# Patient Record
Sex: FEMALE | Race: BLACK OR AFRICAN AMERICAN | NOT HISPANIC OR LATINO | Employment: OTHER | URBAN - METROPOLITAN AREA
[De-identification: names, ages, dates, MRNs, and addresses within clinical notes are randomized per-mention and may not be internally consistent; named-entity substitution may affect disease eponyms.]

---

## 2017-04-03 ENCOUNTER — PREPPED CHART (OUTPATIENT)
Dept: URBAN - METROPOLITAN AREA CLINIC 35 | Facility: CLINIC | Age: 67
End: 2017-04-03

## 2017-08-15 ASSESSMENT — TONOMETRY
OD_IOP_MMHG: 9
OS_IOP_MMHG: 9

## 2017-08-15 ASSESSMENT — VISUAL ACUITY
OS_SC: 20/40+2
OD_SC: 20/30-1

## 2017-08-21 ENCOUNTER — VISUAL FIELD (OUTPATIENT)
Dept: URBAN - METROPOLITAN AREA CLINIC 35 | Facility: CLINIC | Age: 67
End: 2017-08-21

## 2017-08-21 DIAGNOSIS — H40.1112: ICD-10-CM

## 2017-08-21 DIAGNOSIS — Z96.1: ICD-10-CM

## 2017-08-21 DIAGNOSIS — H40.1123: ICD-10-CM

## 2017-08-21 DIAGNOSIS — H04.123: ICD-10-CM

## 2017-08-21 DIAGNOSIS — H26.493: ICD-10-CM

## 2017-08-21 PROCEDURE — 92083 EXTENDED VISUAL FIELD XM: CPT

## 2017-08-21 PROCEDURE — 92012 INTRM OPH EXAM EST PATIENT: CPT

## 2017-08-21 ASSESSMENT — TONOMETRY
OD_IOP_MMHG: 12
OS_IOP_MMHG: 10

## 2017-08-21 ASSESSMENT — VISUAL ACUITY
OD_SC: 20/30-2
OS_SC: 20/30-2

## 2017-11-14 ENCOUNTER — IOP CHECK (OUTPATIENT)
Dept: URBAN - METROPOLITAN AREA CLINIC 35 | Facility: CLINIC | Age: 67
End: 2017-11-14

## 2017-11-14 DIAGNOSIS — H40.1123: ICD-10-CM

## 2017-11-14 DIAGNOSIS — H40.1112: ICD-10-CM

## 2017-11-14 PROCEDURE — 92083 EXTENDED VISUAL FIELD XM: CPT

## 2017-11-14 PROCEDURE — 92012 INTRM OPH EXAM EST PATIENT: CPT

## 2017-11-14 ASSESSMENT — VISUAL ACUITY
OD_SC: 20/30-2
OS_SC: 20/25-1

## 2017-11-14 ASSESSMENT — TONOMETRY
OS_IOP_MMHG: 12
OD_IOP_MMHG: 12

## 2018-03-21 ENCOUNTER — ESTABLISHED COMPREHENSIVE EXAM (OUTPATIENT)
Dept: URBAN - METROPOLITAN AREA CLINIC 35 | Facility: CLINIC | Age: 68
End: 2018-03-21

## 2018-03-21 DIAGNOSIS — H10.45: ICD-10-CM

## 2018-03-21 DIAGNOSIS — H40.1123: ICD-10-CM

## 2018-03-21 DIAGNOSIS — H26.493: ICD-10-CM

## 2018-03-21 DIAGNOSIS — H40.1112: ICD-10-CM

## 2018-03-21 PROCEDURE — 3285F IOP DOWN <15% OF PRE-SVC LVL: CPT

## 2018-03-21 PROCEDURE — 2027F OPTIC NERVE HEAD EVAL DONE: CPT

## 2018-03-21 PROCEDURE — 92014 COMPRE OPH EXAM EST PT 1/>: CPT

## 2018-03-21 PROCEDURE — 0517F GLAUCOMA PLAN OF CARE DOCD: CPT

## 2018-03-21 PROCEDURE — 92133 CPTRZD OPH DX IMG PST SGM ON: CPT

## 2018-03-21 PROCEDURE — G8427 DOCREV CUR MEDS BY ELIG CLIN: HCPCS

## 2018-03-21 PROCEDURE — 92015 DETERMINE REFRACTIVE STATE: CPT

## 2018-03-21 ASSESSMENT — VISUAL ACUITY
OS_SC: 20/40-1
OD_BAT: 20/50
OD_PH: 20/30-2
OD_CC: J1
OS_CC: J3
OD_SC: 20/40-1
OS_PH: 20/30-2
OS_BAT: 20/60

## 2018-03-21 ASSESSMENT — TONOMETRY
OS_IOP_MMHG: 12
OD_IOP_MMHG: 13

## 2018-09-17 ENCOUNTER — IOP CHECK (OUTPATIENT)
Dept: URBAN - METROPOLITAN AREA CLINIC 35 | Facility: CLINIC | Age: 68
End: 2018-09-17

## 2018-09-17 DIAGNOSIS — H40.1123: ICD-10-CM

## 2018-09-17 DIAGNOSIS — H40.1112: ICD-10-CM

## 2018-09-17 PROCEDURE — 92012 INTRM OPH EXAM EST PATIENT: CPT

## 2018-09-17 PROCEDURE — 92083 EXTENDED VISUAL FIELD XM: CPT

## 2018-09-17 RX ORDER — BIMATOPROST 0.1 MG/ML
1 SOLUTION/ DROPS OPHTHALMIC EVERY EVENING
Start: 2017-08-21

## 2018-09-17 ASSESSMENT — VISUAL ACUITY
OS_SC: 20/30-2
OD_SC: 20/40+2

## 2018-09-17 ASSESSMENT — TONOMETRY
OD_IOP_MMHG: 13
OS_IOP_MMHG: 10

## 2019-03-18 ENCOUNTER — ESTABLISHED COMPREHENSIVE EXAM (OUTPATIENT)
Dept: URBAN - METROPOLITAN AREA CLINIC 35 | Facility: CLINIC | Age: 69
End: 2019-03-18

## 2019-03-18 DIAGNOSIS — H04.123: ICD-10-CM

## 2019-03-18 DIAGNOSIS — H26.492: ICD-10-CM

## 2019-03-18 DIAGNOSIS — H40.1123: ICD-10-CM

## 2019-03-18 DIAGNOSIS — H40.1112: ICD-10-CM

## 2019-03-18 PROCEDURE — 92015 DETERMINE REFRACTIVE STATE: CPT

## 2019-03-18 PROCEDURE — 92014 COMPRE OPH EXAM EST PT 1/>: CPT

## 2019-03-18 PROCEDURE — 92133 CPTRZD OPH DX IMG PST SGM ON: CPT

## 2019-03-18 ASSESSMENT — VISUAL ACUITY
OS_SC: J16
OS_SC: 20/50
OD_SC: 20/40
OD_SC: J7

## 2019-03-18 ASSESSMENT — TONOMETRY
OS_IOP_MMHG: 11
OD_IOP_MMHG: 12

## 2019-03-20 ASSESSMENT — VISUAL ACUITY
OS_SC: J12
OS_SC: 20/50
OD_SC: 20/40
OD_SC: J7

## 2019-03-21 ENCOUNTER — SURGERY/PROCEDURE (OUTPATIENT)
Dept: URBAN - METROPOLITAN AREA SURGERY 14 | Facility: SURGERY | Age: 69
End: 2019-03-21

## 2019-03-21 ENCOUNTER — CONSULT (OUTPATIENT)
Dept: URBAN - METROPOLITAN AREA SURGERY 14 | Facility: SURGERY | Age: 69
End: 2019-03-21

## 2019-03-21 DIAGNOSIS — H26.492: ICD-10-CM

## 2019-03-21 PROCEDURE — 66821 AFTER CATARACT LASER SURGERY: CPT

## 2019-03-21 PROCEDURE — 92014 COMPRE OPH EXAM EST PT 1/>: CPT

## 2019-03-21 ASSESSMENT — TONOMETRY
OD_IOP_MMHG: 12
OS_IOP_MMHG: 12

## 2019-03-26 ENCOUNTER — POST-OP (OUTPATIENT)
Dept: URBAN - METROPOLITAN AREA CLINIC 35 | Facility: CLINIC | Age: 69
End: 2019-03-26

## 2019-03-26 DIAGNOSIS — Z98.890: ICD-10-CM

## 2019-03-26 PROCEDURE — 99024 POSTOP FOLLOW-UP VISIT: CPT

## 2019-03-26 ASSESSMENT — VISUAL ACUITY
OS_SC: 20/40
OD_SC: 20/40

## 2019-03-26 ASSESSMENT — TONOMETRY
OD_IOP_MMHG: 12
OS_IOP_MMHG: 12

## 2019-09-18 ENCOUNTER — IOP CHECK (OUTPATIENT)
Dept: URBAN - METROPOLITAN AREA CLINIC 35 | Facility: CLINIC | Age: 69
End: 2019-09-18

## 2019-09-18 DIAGNOSIS — H40.1123: ICD-10-CM

## 2019-09-18 DIAGNOSIS — H40.1112: ICD-10-CM

## 2019-09-18 DIAGNOSIS — H04.123: ICD-10-CM

## 2019-09-18 DIAGNOSIS — H01.014: ICD-10-CM

## 2019-09-18 PROCEDURE — 92083 EXTENDED VISUAL FIELD XM: CPT

## 2019-09-18 PROCEDURE — 92012 INTRM OPH EXAM EST PATIENT: CPT

## 2019-09-18 RX ORDER — ERYTHROMYCIN 5 MG/G
OINTMENT OPHTHALMIC EVERY EVENING
Start: 2019-09-18

## 2019-09-18 ASSESSMENT — VISUAL ACUITY
OD_SC: 20/30-2
OS_SC: 20/40

## 2019-09-18 ASSESSMENT — TONOMETRY
OD_IOP_MMHG: 12
OS_IOP_MMHG: 12

## 2020-05-21 ENCOUNTER — ESTABLISHED COMPREHENSIVE EXAM (OUTPATIENT)
Dept: URBAN - METROPOLITAN AREA CLINIC 35 | Facility: CLINIC | Age: 70
End: 2020-05-21

## 2020-05-21 DIAGNOSIS — H26.492: ICD-10-CM

## 2020-05-21 DIAGNOSIS — H40.1123: ICD-10-CM

## 2020-05-21 DIAGNOSIS — H31.102: ICD-10-CM

## 2020-05-21 DIAGNOSIS — H01.014: ICD-10-CM

## 2020-05-21 DIAGNOSIS — H40.1112: ICD-10-CM

## 2020-05-21 DIAGNOSIS — Z98.890: ICD-10-CM

## 2020-05-21 DIAGNOSIS — H26.491: ICD-10-CM

## 2020-05-21 DIAGNOSIS — H04.123: ICD-10-CM

## 2020-05-21 PROCEDURE — 92014 COMPRE OPH EXAM EST PT 1/>: CPT

## 2020-05-21 PROCEDURE — 92250 FUNDUS PHOTOGRAPHY W/I&R: CPT

## 2020-05-21 PROCEDURE — 92133 CPTRZD OPH DX IMG PST SGM ON: CPT

## 2020-05-21 PROCEDURE — 92015 DETERMINE REFRACTIVE STATE: CPT

## 2020-05-21 ASSESSMENT — KERATOMETRY
OD_K1POWER_DIOPTERS: 44.25
OS_K1POWER_DIOPTERS: 43.75
OD_K2POWER_DIOPTERS: 43.75
OS_K2POWER_DIOPTERS: 42.75

## 2020-05-21 ASSESSMENT — VISUAL ACUITY
OS_SC: J3
OU_SC: 20/25-2
OD_SC: J3
OS_PH: 20/30
OS_SC: 20/40
OD_PH: 20/25
OU_SC: J2-
OD_SC: 20/30-2

## 2020-05-21 ASSESSMENT — TONOMETRY
OS_IOP_MMHG: 13
OD_IOP_MMHG: 12

## 2020-09-15 ASSESSMENT — KERATOMETRY
OD_K2POWER_DIOPTERS: 43.75
OS_K2POWER_DIOPTERS: 42.75
OD_K1POWER_DIOPTERS: 44.25
OS_K1POWER_DIOPTERS: 43.75

## 2020-09-25 ENCOUNTER — TECH ONLY (OUTPATIENT)
Dept: URBAN - METROPOLITAN AREA CLINIC 35 | Facility: CLINIC | Age: 70
End: 2020-09-25

## 2020-09-25 DIAGNOSIS — H40.1123: ICD-10-CM

## 2020-09-25 DIAGNOSIS — H40.1112: ICD-10-CM

## 2020-09-25 PROCEDURE — 92083 EXTENDED VISUAL FIELD XM: CPT

## 2020-09-25 PROCEDURE — 99211T TECH SERVICE

## 2020-11-11 ENCOUNTER — IOP CHECK (OUTPATIENT)
Dept: URBAN - METROPOLITAN AREA CLINIC 35 | Facility: CLINIC | Age: 70
End: 2020-11-11

## 2020-11-11 DIAGNOSIS — H04.123: ICD-10-CM

## 2020-11-11 DIAGNOSIS — H40.1112: ICD-10-CM

## 2020-11-11 DIAGNOSIS — H40.1123: ICD-10-CM

## 2020-11-11 DIAGNOSIS — H10.45: ICD-10-CM

## 2020-11-11 PROCEDURE — 92012 INTRM OPH EXAM EST PATIENT: CPT

## 2020-11-11 RX ORDER — OLOPATADINE HYDROCHLORIDE 2 MG/ML
1 SOLUTION OPHTHALMIC EVERY MORNING
Start: 2020-11-11

## 2020-11-11 ASSESSMENT — KERATOMETRY
OS_K1POWER_DIOPTERS: 43.75
OD_K2POWER_DIOPTERS: 43.75
OS_K2POWER_DIOPTERS: 42.75
OD_K1POWER_DIOPTERS: 44.25

## 2020-11-11 ASSESSMENT — VISUAL ACUITY
OS_SC: 20/40
OU_SC: 20/30-2
OD_SC: 20/40-1

## 2020-11-11 ASSESSMENT — TONOMETRY
OD_IOP_MMHG: 14
OS_IOP_MMHG: 14

## 2021-05-12 ENCOUNTER — ESTABLISHED COMPREHENSIVE EXAM (OUTPATIENT)
Dept: URBAN - METROPOLITAN AREA CLINIC 35 | Facility: CLINIC | Age: 71
End: 2021-05-12

## 2021-05-12 DIAGNOSIS — H40.1123: ICD-10-CM

## 2021-05-12 DIAGNOSIS — H31.102: ICD-10-CM

## 2021-05-12 DIAGNOSIS — H10.45: ICD-10-CM

## 2021-05-12 DIAGNOSIS — H26.491: ICD-10-CM

## 2021-05-12 DIAGNOSIS — H40.1112: ICD-10-CM

## 2021-05-12 DIAGNOSIS — H01.014: ICD-10-CM

## 2021-05-12 DIAGNOSIS — H52.13: ICD-10-CM

## 2021-05-12 DIAGNOSIS — H04.123: ICD-10-CM

## 2021-05-12 PROCEDURE — 92014 COMPRE OPH EXAM EST PT 1/>: CPT

## 2021-05-12 PROCEDURE — 92015 DETERMINE REFRACTIVE STATE: CPT

## 2021-05-12 PROCEDURE — 92133 CPTRZD OPH DX IMG PST SGM ON: CPT

## 2021-05-12 ASSESSMENT — VISUAL ACUITY
OS_CC: J1
OD_CC: J1
OS_SC: 20/30-2
OD_SC: 20/30-2

## 2021-05-12 ASSESSMENT — KERATOMETRY
OS_K2POWER_DIOPTERS: 44
OS_K1POWER_DIOPTERS: 43.75
OD_K2POWER_DIOPTERS: 42.75
OD_K1POWER_DIOPTERS: 44.75

## 2021-05-12 ASSESSMENT — TONOMETRY
OS_IOP_MMHG: 13
OD_IOP_MMHG: 13

## 2021-09-30 ASSESSMENT — KERATOMETRY
OD_K1POWER_DIOPTERS: 44.75
OS_K1POWER_DIOPTERS: 43.75
OD_K2POWER_DIOPTERS: 42.75
OS_K2POWER_DIOPTERS: 44

## 2021-10-08 ENCOUNTER — TECH ONLY (OUTPATIENT)
Dept: URBAN - METROPOLITAN AREA CLINIC 35 | Facility: CLINIC | Age: 71
End: 2021-10-08

## 2021-10-08 DIAGNOSIS — H40.1112: ICD-10-CM

## 2021-10-08 DIAGNOSIS — H40.1123: ICD-10-CM

## 2021-10-08 PROCEDURE — 99211T TECH SERVICE

## 2021-10-08 PROCEDURE — 92083 EXTENDED VISUAL FIELD XM: CPT

## 2021-10-08 RX ORDER — BIMATOPROST 0.1 MG/ML: 1 SOLUTION/ DROPS OPHTHALMIC EVERY EVENING

## 2021-10-08 RX ORDER — BRIMONIDINE TARTRATE 2 MG/MG: 1 SOLUTION/ DROPS OPHTHALMIC TWICE A DAY

## 2021-12-06 ENCOUNTER — FOLLOW UP (OUTPATIENT)
Dept: URBAN - METROPOLITAN AREA CLINIC 35 | Facility: CLINIC | Age: 71
End: 2021-12-06

## 2021-12-06 DIAGNOSIS — H40.1123: ICD-10-CM

## 2021-12-06 DIAGNOSIS — H10.45: ICD-10-CM

## 2021-12-06 DIAGNOSIS — H04.123: ICD-10-CM

## 2021-12-06 DIAGNOSIS — H40.1112: ICD-10-CM

## 2021-12-06 DIAGNOSIS — H01.014: ICD-10-CM

## 2021-12-06 PROCEDURE — 92012 INTRM OPH EXAM EST PATIENT: CPT

## 2021-12-06 ASSESSMENT — KERATOMETRY
OS_K2POWER_DIOPTERS: 44
OS_K1POWER_DIOPTERS: 43.75
OD_K1POWER_DIOPTERS: 44.75
OD_K2POWER_DIOPTERS: 42.75

## 2021-12-06 ASSESSMENT — VISUAL ACUITY
OU_SC: 20/30+1
OD_SC: 20/30-2
OS_SC: 20/40+1

## 2021-12-06 ASSESSMENT — TONOMETRY
OD_IOP_MMHG: 11
OS_IOP_MMHG: 12

## 2022-05-16 ENCOUNTER — COMPREHENSIVE EXAM (OUTPATIENT)
Dept: URBAN - METROPOLITAN AREA CLINIC 35 | Facility: CLINIC | Age: 72
End: 2022-05-16

## 2022-05-16 DIAGNOSIS — H26.491: ICD-10-CM

## 2022-05-16 DIAGNOSIS — H31.102: ICD-10-CM

## 2022-05-16 DIAGNOSIS — H40.1112: ICD-10-CM

## 2022-05-16 DIAGNOSIS — H26.492: ICD-10-CM

## 2022-05-16 DIAGNOSIS — H52.13: ICD-10-CM

## 2022-05-16 DIAGNOSIS — H01.014: ICD-10-CM

## 2022-05-16 DIAGNOSIS — H04.123: ICD-10-CM

## 2022-05-16 DIAGNOSIS — H40.1123: ICD-10-CM

## 2022-05-16 DIAGNOSIS — Z98.890: ICD-10-CM

## 2022-05-16 DIAGNOSIS — H10.45: ICD-10-CM

## 2022-05-16 PROCEDURE — 92015 DETERMINE REFRACTIVE STATE: CPT

## 2022-05-16 PROCEDURE — 92014 COMPRE OPH EXAM EST PT 1/>: CPT

## 2022-05-16 PROCEDURE — 92250 FUNDUS PHOTOGRAPHY W/I&R: CPT

## 2022-05-16 ASSESSMENT — TONOMETRY
OD_IOP_MMHG: 11
OS_IOP_MMHG: 11

## 2022-05-16 ASSESSMENT — VISUAL ACUITY
OS_SC: J7
OS_SC: 20/40
OD_SC: 20/30
OD_SC: J5

## 2022-05-23 NOTE — PATIENT DISCUSSION
Educated patient on findings and condition. Continue artificial tears BID/prn OU and begin warm compresses QD/prn OU. Monitor.

## 2022-05-23 NOTE — PATIENT DISCUSSION
Educated patient on findings. Based on strong FHx and cupping OU. IOP WNL with normal/stable OCT RNFL today OU. Strong FHx. No treatment indicated at this time. Order baseline HVF 24-2 OU in 2-3 months. Follow-up as directed. Monitor.

## 2022-10-28 ENCOUNTER — TECH ONLY (OUTPATIENT)
Dept: URBAN - METROPOLITAN AREA CLINIC 35 | Facility: CLINIC | Age: 72
End: 2022-10-28

## 2022-10-28 DIAGNOSIS — H40.1112: ICD-10-CM

## 2022-10-28 DIAGNOSIS — H40.1123: ICD-10-CM

## 2022-10-28 PROCEDURE — 92083 EXTENDED VISUAL FIELD XM: CPT

## 2022-10-28 PROCEDURE — 99211T TECH SERVICE

## 2022-10-28 ASSESSMENT — KERATOMETRY
OS_K1POWER_DIOPTERS: 43.75
OS_K2POWER_DIOPTERS: 44
OD_K2POWER_DIOPTERS: 42.75
OD_K1POWER_DIOPTERS: 44.75

## 2022-11-14 ENCOUNTER — FOLLOW UP (OUTPATIENT)
Dept: URBAN - METROPOLITAN AREA CLINIC 35 | Facility: CLINIC | Age: 72
End: 2022-11-14

## 2022-11-14 DIAGNOSIS — H40.1112: ICD-10-CM

## 2022-11-14 DIAGNOSIS — H04.123: ICD-10-CM

## 2022-11-14 DIAGNOSIS — H40.1123: ICD-10-CM

## 2022-11-14 PROCEDURE — 92133 CPTRZD OPH DX IMG PST SGM ON: CPT

## 2022-11-14 PROCEDURE — 92012 INTRM OPH EXAM EST PATIENT: CPT

## 2022-11-14 ASSESSMENT — VISUAL ACUITY
OS_PH: 20/30
OD_PH: 20/30+2
OD_SC: 20/40
OS_SC: 20/40

## 2022-11-14 ASSESSMENT — TONOMETRY
OD_IOP_MMHG: 14
OS_IOP_MMHG: 14

## 2022-12-02 NOTE — PATIENT DISCUSSION
Baseline HVF 24-2 today WNL OD, nasal edge points OS (poor reliability). No treatment indicated at this time. Follow-up as directed. Monitor.

## 2022-12-12 ENCOUNTER — CONSULTATION/EVALUATION (OUTPATIENT)
Dept: URBAN - METROPOLITAN AREA CLINIC 39 | Facility: CLINIC | Age: 72
End: 2022-12-12

## 2022-12-12 PROCEDURE — 65855 TRABECULOPLASTY LASER SURG: CPT

## 2022-12-12 PROCEDURE — 92250 FUNDUS PHOTOGRAPHY W/I&R: CPT

## 2022-12-12 PROCEDURE — 92020 GONIOSCOPY: CPT

## 2022-12-12 PROCEDURE — 92004 COMPRE OPH EXAM NEW PT 1/>: CPT

## 2022-12-12 RX ORDER — BROMFENAC SODIUM 0.7 MG/ML: 1 SOLUTION/ DROPS OPHTHALMIC ONCE A DAY

## 2022-12-12 ASSESSMENT — TONOMETRY
OS_IOP_MMHG: 13
OD_IOP_MMHG: 14

## 2022-12-12 ASSESSMENT — VISUAL ACUITY
OS_PH: 20/25
OD_SC: J4
OD_SC: 20/30
OS_SC: 20/40
OS_SC: J6

## 2023-01-11 ENCOUNTER — FOLLOW UP (OUTPATIENT)
Dept: URBAN - METROPOLITAN AREA CLINIC 35 | Facility: CLINIC | Age: 73
End: 2023-01-11

## 2023-01-11 DIAGNOSIS — H40.1123: ICD-10-CM

## 2023-01-11 DIAGNOSIS — H40.1112: ICD-10-CM

## 2023-01-11 PROCEDURE — 92012 INTRM OPH EXAM EST PATIENT: CPT

## 2023-01-11 ASSESSMENT — TONOMETRY
OS_IOP_MMHG: 12
OD_IOP_MMHG: 12

## 2023-01-11 ASSESSMENT — VISUAL ACUITY
OS_PH: 20/30+2
OS_SC: 20/40+2
OD_PH: 20/25

## 2023-04-18 ENCOUNTER — COMPREHENSIVE EXAM (OUTPATIENT)
Dept: URBAN - METROPOLITAN AREA CLINIC 35 | Facility: CLINIC | Age: 73
End: 2023-04-18

## 2023-04-18 DIAGNOSIS — H04.123: ICD-10-CM

## 2023-04-18 DIAGNOSIS — H26.491: ICD-10-CM

## 2023-04-18 DIAGNOSIS — H40.1123: ICD-10-CM

## 2023-04-18 DIAGNOSIS — H10.45: ICD-10-CM

## 2023-04-18 DIAGNOSIS — H01.014: ICD-10-CM

## 2023-04-18 DIAGNOSIS — H52.13: ICD-10-CM

## 2023-04-18 DIAGNOSIS — H40.1112: ICD-10-CM

## 2023-04-18 DIAGNOSIS — H31.102: ICD-10-CM

## 2023-04-18 PROCEDURE — 92015 DETERMINE REFRACTIVE STATE: CPT

## 2023-04-18 PROCEDURE — 92014 COMPRE OPH EXAM EST PT 1/>: CPT

## 2023-04-18 PROCEDURE — 92250 FUNDUS PHOTOGRAPHY W/I&R: CPT

## 2023-04-18 RX ORDER — LOTEPREDNOL ETABONATE 3.8 MG/G
1 GEL OPHTHALMIC
Start: 2023-04-18 | End: 2023-05-09

## 2023-04-18 ASSESSMENT — KERATOMETRY
OD_AXISANGLE_DEGREES: 150
OD_K1POWER_DIOPTERS: 43.25
OD_K2POWER_DIOPTERS: 43.75
OS_K1POWER_DIOPTERS: 43.00
OS_AXISANGLE2_DEGREES: 165
OS_K2POWER_DIOPTERS: 43.50
OD_AXISANGLE2_DEGREES: 60
OS_AXISANGLE_DEGREES: 75

## 2023-04-18 ASSESSMENT — VISUAL ACUITY
OU_SC: 20/30
OD_SC: 20/50
OD_PH: 20/30
OS_SC: J10
OD_SC: J10
OS_PH: 20/25-2
OU_SC: J6
OS_SC: 20/40+2

## 2023-04-18 ASSESSMENT — TONOMETRY
OS_IOP_MMHG: 11
OD_IOP_MMHG: 11

## 2023-10-12 ENCOUNTER — TECH ONLY (OUTPATIENT)
Dept: URBAN - METROPOLITAN AREA CLINIC 35 | Facility: CLINIC | Age: 73
End: 2023-10-12

## 2023-10-12 DIAGNOSIS — H40.1123: ICD-10-CM

## 2023-10-12 DIAGNOSIS — H40.1112: ICD-10-CM

## 2023-10-12 PROCEDURE — 99211T TECH SERVICE: Mod: TC

## 2023-10-12 PROCEDURE — 92083 EXTENDED VISUAL FIELD XM: CPT

## 2023-10-12 ASSESSMENT — KERATOMETRY
OS_K2POWER_DIOPTERS: 43.50
OS_AXISANGLE_DEGREES: 75
OD_AXISANGLE2_DEGREES: 60
OD_K1POWER_DIOPTERS: 43.25
OD_AXISANGLE_DEGREES: 150
OD_K2POWER_DIOPTERS: 43.75
OS_AXISANGLE2_DEGREES: 165
OS_K1POWER_DIOPTERS: 43.00

## 2023-10-18 ENCOUNTER — FOLLOW UP (OUTPATIENT)
Dept: URBAN - METROPOLITAN AREA CLINIC 35 | Facility: CLINIC | Age: 73
End: 2023-10-18

## 2023-10-18 DIAGNOSIS — H01.014: ICD-10-CM

## 2023-10-18 DIAGNOSIS — H40.1112: ICD-10-CM

## 2023-10-18 DIAGNOSIS — H40.1123: ICD-10-CM

## 2023-10-18 DIAGNOSIS — H10.45: ICD-10-CM

## 2023-10-18 DIAGNOSIS — H04.123: ICD-10-CM

## 2023-10-18 DIAGNOSIS — H31.103: ICD-10-CM

## 2023-10-18 DIAGNOSIS — H26.491: ICD-10-CM

## 2023-10-18 PROCEDURE — 92012 INTRM OPH EXAM EST PATIENT: CPT

## 2023-10-18 ASSESSMENT — TONOMETRY
OD_IOP_MMHG: 12
OS_IOP_MMHG: 12

## 2023-10-18 ASSESSMENT — VISUAL ACUITY
OD_SC: 20/40+1
OS_SC: 20/30-2
OU_SC: 20/30+2
OD_PH: 20/30

## 2023-12-06 ENCOUNTER — EMERGENCY VISIT (OUTPATIENT)
Dept: URBAN - METROPOLITAN AREA CLINIC 35 | Facility: CLINIC | Age: 73
End: 2023-12-06

## 2023-12-06 DIAGNOSIS — H04.123: ICD-10-CM

## 2023-12-06 DIAGNOSIS — H10.45: ICD-10-CM

## 2023-12-06 DIAGNOSIS — H40.1123: ICD-10-CM

## 2023-12-06 DIAGNOSIS — H40.1112: ICD-10-CM

## 2023-12-06 PROCEDURE — 99213 OFFICE O/P EST LOW 20 MIN: CPT

## 2023-12-06 RX ORDER — PREDNISOLONE ACETATE 10 MG/ML
1 SUSPENSION/ DROPS OPHTHALMIC TWICE A DAY
Start: 2023-12-06

## 2023-12-06 ASSESSMENT — TONOMETRY
OS_IOP_MMHG: 11
OD_IOP_MMHG: 11

## 2023-12-06 ASSESSMENT — VISUAL ACUITY
OD_SC: 20/50
OS_SC: 20/50
OD_PH: 20/30-2
OS_PH: 20/40

## 2024-04-10 ENCOUNTER — COMPREHENSIVE EXAM (OUTPATIENT)
Dept: URBAN - METROPOLITAN AREA CLINIC 35 | Facility: CLINIC | Age: 74
End: 2024-04-10

## 2024-04-10 DIAGNOSIS — H01.014: ICD-10-CM

## 2024-04-10 DIAGNOSIS — H40.1123: ICD-10-CM

## 2024-04-10 DIAGNOSIS — H52.13: ICD-10-CM

## 2024-04-10 DIAGNOSIS — H26.491: ICD-10-CM

## 2024-04-10 DIAGNOSIS — H40.1112: ICD-10-CM

## 2024-04-10 DIAGNOSIS — H04.123: ICD-10-CM

## 2024-04-10 PROCEDURE — 92133 CPTRZD OPH DX IMG PST SGM ON: CPT

## 2024-04-10 PROCEDURE — 92014 COMPRE OPH EXAM EST PT 1/>: CPT

## 2024-04-10 PROCEDURE — 92015 DETERMINE REFRACTIVE STATE: CPT

## 2024-04-10 ASSESSMENT — VISUAL ACUITY
OD_SC: 20/40+2
OD_SC: J10
OU_SC: 20/30-1
OS_SC: 20/40
OU_SC: J8
OS_SC: J10
OD_PH: 20/25-1
OS_PH: 20/30

## 2024-04-10 ASSESSMENT — TONOMETRY
OD_IOP_MMHG: 10
OS_IOP_MMHG: 11

## 2025-04-16 ENCOUNTER — PREPPED CHART (OUTPATIENT)
Age: 75
End: 2025-04-16